# Patient Record
Sex: FEMALE | Race: OTHER | NOT HISPANIC OR LATINO
[De-identification: names, ages, dates, MRNs, and addresses within clinical notes are randomized per-mention and may not be internally consistent; named-entity substitution may affect disease eponyms.]

---

## 2020-11-13 ENCOUNTER — APPOINTMENT (OUTPATIENT)
Dept: GASTROENTEROLOGY | Facility: CLINIC | Age: 55
End: 2020-11-13
Payer: COMMERCIAL

## 2020-11-13 DIAGNOSIS — Z78.9 OTHER SPECIFIED HEALTH STATUS: ICD-10-CM

## 2020-11-13 DIAGNOSIS — Z80.1 FAMILY HISTORY OF MALIGNANT NEOPLASM OF TRACHEA, BRONCHUS AND LUNG: ICD-10-CM

## 2020-11-13 DIAGNOSIS — Z80.3 FAMILY HISTORY OF MALIGNANT NEOPLASM OF BREAST: ICD-10-CM

## 2020-11-13 DIAGNOSIS — Z82.49 FAMILY HISTORY OF ISCHEMIC HEART DISEASE AND OTHER DISEASES OF THE CIRCULATORY SYSTEM: ICD-10-CM

## 2020-11-13 PROBLEM — Z00.00 ENCOUNTER FOR PREVENTIVE HEALTH EXAMINATION: Status: ACTIVE | Noted: 2020-11-13

## 2020-11-13 PROCEDURE — 99204 OFFICE O/P NEW MOD 45 MIN: CPT | Mod: 95

## 2020-11-13 NOTE — ASSESSMENT
[FreeTextEntry1] : The patient is at average risk for colorectal cancer. The bowel preparation was discussed at length. Risks (including bleeding, pain, perforation, incomplete examination, splenic laceration, adverse reactions to medications, aspiration and death), benefits and alternatives were discussed. Patient is agreeable for the colonoscopy. The patient is medically optimized for the procedure. We will schedule the patient for the procedure. Bowel preparation was sent to the pharmacy.\par \par I spent 20 minutes on the encounter\par \par \par Tien Grififn MD\par Gastroenterology \par \par

## 2020-11-13 NOTE — HISTORY OF PRESENT ILLNESS
[Home] : at home, [unfilled] , at the time of the visit. [Medical Office: ([facility])___] : at the medical office located in  [Spouse] : spouse [Verbal consent obtained from patient] : the patient, [unfilled] [de-identified] : The patient is being consulted for colorectal cancer screening. The patient is denying any complaints. She never had a colonoscopy before. She has no GI symptoms. She has no family history of colorectal cancer.no chest pain or shortness of breath.

## 2021-03-08 DIAGNOSIS — Z01.818 ENCOUNTER FOR OTHER PREPROCEDURAL EXAMINATION: ICD-10-CM

## 2021-03-12 ENCOUNTER — APPOINTMENT (OUTPATIENT)
Dept: DISASTER EMERGENCY | Facility: CLINIC | Age: 56
End: 2021-03-12

## 2021-03-13 LAB — SARS-COV-2 N GENE NPH QL NAA+PROBE: NOT DETECTED

## 2021-03-16 ENCOUNTER — OUTPATIENT (OUTPATIENT)
Dept: OUTPATIENT SERVICES | Facility: HOSPITAL | Age: 56
LOS: 1 days | End: 2021-03-16
Payer: COMMERCIAL

## 2021-03-16 ENCOUNTER — APPOINTMENT (OUTPATIENT)
Dept: GASTROENTEROLOGY | Facility: GI CENTER | Age: 56
End: 2021-03-16
Payer: COMMERCIAL

## 2021-03-16 ENCOUNTER — RESULT REVIEW (OUTPATIENT)
Age: 56
End: 2021-03-16

## 2021-03-16 DIAGNOSIS — K63.5 POLYP OF COLON: ICD-10-CM

## 2021-03-16 DIAGNOSIS — Z12.11 ENCOUNTER FOR SCREENING FOR MALIGNANT NEOPLASM OF COLON: ICD-10-CM

## 2021-03-16 DIAGNOSIS — K64.8 OTHER HEMORRHOIDS: ICD-10-CM

## 2021-03-16 DIAGNOSIS — K57.30 DIVERTICULOSIS OF LARGE INTESTINE W/OUT PERFORATION OR ABSCESS W/OUT BLEEDING: ICD-10-CM

## 2021-03-16 PROCEDURE — 45380 COLONOSCOPY AND BIOPSY: CPT | Mod: PT

## 2021-03-16 PROCEDURE — 88305 TISSUE EXAM BY PATHOLOGIST: CPT | Mod: 26

## 2021-03-16 PROCEDURE — 45380 COLONOSCOPY AND BIOPSY: CPT | Mod: 33

## 2021-03-16 PROCEDURE — 88305 TISSUE EXAM BY PATHOLOGIST: CPT

## 2021-03-16 RX ORDER — SODIUM SULFATE, POTASSIUM SULFATE, MAGNESIUM SULFATE 17.5; 3.13; 1.6 G/ML; G/ML; G/ML
17.5-3.13-1.6 SOLUTION, CONCENTRATE ORAL
Qty: 1 | Refills: 0 | Status: COMPLETED | COMMUNITY
Start: 2020-11-13 | End: 2021-03-16

## 2021-03-16 NOTE — ASSESSMENT
[FreeTextEntry1] : IMPRESSION:\par Colon polyps x 3 \par Diverticulosis of colon\par Internal hemorrhoids\par \par RECOMMENDATIONS:\par High fiber diet\par Repeat colonoscopy in 5 years

## 2021-03-16 NOTE — PROCEDURE
[With Biopsy] : with biopsy [With Polypectomy] : polypectomy [Colon Cancer Screening] : colon cancer screening [Procedure Explained] : The procedure was explained [Allergies Reviewed] : allergies reviewed. [Risks] : Risks [Benefits] : benefits [Alternatives] : alternatives [Consent Obtained] : written consent was obtained prior to the procedure and is detailed in the patient's record [Patient] : the patient [Bowel Prep Kit] : the patient took the appropriate bowel preparation kit as directed [Approved Diet Followed] : the patient avoided solid foods and adhered to the approved diet list for 24 hours prior to the procedure [Automated Blood Pressure Cuff] : automated blood pressure cuff [Cardiac Monitor] : cardiac monitor [Pulse Oximeter] : pulse oximeter [1] : 1 [Prep Qualtiy: ___] : Prep Quality:  [unfilled] [Withdrawal Time: ___] : Withdrawal Time:  [unfilled] [Performed By: ___] : Performed by:  PAULY [Left Lateral Decubitus] : The patient was positioned in the left lateral decubitus position [Cecum (Landmarks)] : and guided to the cecum which was identified by the anatomic landmarks of the appendiceal orifice and ileocecal valve [Terminal Ileum via Ileocecal Valve] : and the terminal ileum was examined by entering the ileocecal valve [No Difficulty] : without difficulty [Insufflated] : insufflated [Single Pass Needed] : after a single pass [Normal] : Normal [Diverticulosis] : diverticulosis [Polyps] : polyps [Biopsy] : biopsy [Hemorrhoids] : hemorrhoids [Sent to Pathology] : was sent to pathology for analysis [Tolerated Well] : the patient tolerated the procedure well [Vital Signs Stable] : the vital signs were stable [No Complications] : There were no complications [Abnormal Rectum] : a normal rectum [External Hemorrhoids] : no external hemorrhoids [Patient Rotated Into Alternating Positions] : the patient was not rotated [de-identified] : Two polyps (2mm, 3 mm) noted s/p cold biopsy forceps polypectomy\par Normal ileocecal valve\par Normal terminal ileum [de-identified] : 3 mm polyp noted s/p cold biopsy forceps polypectomy [de-identified] : Cecal polyps x 2; Sigmoid colon polyp

## 2021-03-16 NOTE — PHYSICAL EXAM
[General Appearance - Alert] : alert [General Appearance - In No Acute Distress] : in no acute distress [General Appearance - Well Nourished] : well nourished [General Appearance - Well Developed] : well developed [General Appearance - Well-Appearing] : healthy appearing [Sclera] : the sclera and conjunctiva were normal [PERRL With Normal Accommodation] : pupils were equal in size, round, and reactive to light [Extraocular Movements] : extraocular movements were intact [Outer Ear] : the ears and nose were normal in appearance [Oropharynx] : the oropharynx was normal [Neck Appearance] : the appearance of the neck was normal [Neck Cervical Mass (___cm)] : no neck mass was observed [Jugular Venous Distention Increased] : there was no jugular-venous distention [Thyroid Diffuse Enlargement] : the thyroid was not enlarged [Thyroid Nodule] : there were no palpable thyroid nodules [Auscultation Breath Sounds / Voice Sounds] : lungs were clear to auscultation bilaterally [Heart Rate And Rhythm] : heart rate was normal and rhythm regular [Heart Sounds] : normal S1 and S2 [Heart Sounds Gallop] : no gallops [Murmurs] : no murmurs [Heart Sounds Pericardial Friction Rub] : no pericardial rub [Bowel Sounds] : normal bowel sounds [Abdomen Soft] : soft [Abdomen Tenderness] : non-tender [Abdomen Mass (___ Cm)] : no abdominal mass palpated [Cervical Lymph Nodes Enlarged Posterior Bilaterally] : posterior cervical [Cervical Lymph Nodes Enlarged Anterior Bilaterally] : anterior cervical [Supraclavicular Lymph Nodes Enlarged Bilaterally] : supraclavicular [No CVA Tenderness] : no ~M costovertebral angle tenderness [No Spinal Tenderness] : no spinal tenderness [Abnormal Walk] : normal gait [Nail Clubbing] : no clubbing  or cyanosis of the fingernails [Musculoskeletal - Swelling] : no joint swelling seen [Motor Tone] : muscle strength and tone were normal [Skin Color & Pigmentation] : normal skin color and pigmentation [Skin Turgor] : normal skin turgor [] : no rash [No Focal Deficits] : no focal deficits [Oriented To Time, Place, And Person] : oriented to person, place, and time [Impaired Insight] : insight and judgment were intact [Affect] : the affect was normal

## 2021-03-16 NOTE — PROCEDURE
[With Biopsy] : with biopsy [With Polypectomy] : polypectomy [Colon Cancer Screening] : colon cancer screening [Procedure Explained] : The procedure was explained [Allergies Reviewed] : allergies reviewed. [Risks] : Risks [Benefits] : benefits [Alternatives] : alternatives [Consent Obtained] : written consent was obtained prior to the procedure and is detailed in the patient's record [Patient] : the patient [Bowel Prep Kit] : the patient took the appropriate bowel preparation kit as directed [Approved Diet Followed] : the patient avoided solid foods and adhered to the approved diet list for 24 hours prior to the procedure [Automated Blood Pressure Cuff] : automated blood pressure cuff [Cardiac Monitor] : cardiac monitor [Pulse Oximeter] : pulse oximeter [1] : 1 [Prep Qualtiy: ___] : Prep Quality:  [unfilled] [Withdrawal Time: ___] : Withdrawal Time:  [unfilled] [Performed By: ___] : Performed by:  PAULY [Left Lateral Decubitus] : The patient was positioned in the left lateral decubitus position [Cecum (Landmarks)] : and guided to the cecum which was identified by the anatomic landmarks of the appendiceal orifice and ileocecal valve [Terminal Ileum via Ileocecal Valve] : and the terminal ileum was examined by entering the ileocecal valve [No Difficulty] : without difficulty [Insufflated] : insufflated [Single Pass Needed] : after a single pass [Normal] : Normal [Diverticulosis] : diverticulosis [Polyps] : polyps [Biopsy] : biopsy [Hemorrhoids] : hemorrhoids [Sent to Pathology] : was sent to pathology for analysis [Tolerated Well] : the patient tolerated the procedure well [Vital Signs Stable] : the vital signs were stable [No Complications] : There were no complications [Abnormal Rectum] : a normal rectum [External Hemorrhoids] : no external hemorrhoids [Patient Rotated Into Alternating Positions] : the patient was not rotated [de-identified] : Two polyps (2mm, 3 mm) noted s/p cold biopsy forceps polypectomy\par Normal ileocecal valve\par Normal terminal ileum [de-identified] : 3 mm polyp noted s/p cold biopsy forceps polypectomy [de-identified] : Cecal polyps x 2; Sigmoid colon polyp

## 2021-03-18 LAB — SURGICAL PATHOLOGY STUDY: SIGNIFICANT CHANGE UP

## 2021-03-24 ENCOUNTER — NON-APPOINTMENT (OUTPATIENT)
Age: 56
End: 2021-03-24

## 2021-04-01 ENCOUNTER — APPOINTMENT (OUTPATIENT)
Dept: DISASTER EMERGENCY | Facility: OTHER | Age: 56
End: 2021-04-01

## 2022-08-12 ENCOUNTER — EMERGENCY (EMERGENCY)
Facility: HOSPITAL | Age: 57
LOS: 1 days | Discharge: HOME | End: 2022-08-12
Attending: EMERGENCY MEDICINE
Payer: COMMERCIAL

## 2022-08-12 VITALS
DIASTOLIC BLOOD PRESSURE: 80 MMHG | SYSTOLIC BLOOD PRESSURE: 126 MMHG | TEMPERATURE: 98 F | OXYGEN SATURATION: 98 % | HEART RATE: 76 BPM | WEIGHT: 147.93 LBS | RESPIRATION RATE: 16 BRPM

## 2022-08-12 PROCEDURE — 12001 RPR S/N/AX/GEN/TRNK 2.5CM/<: CPT

## 2022-08-12 NOTE — ED ADULT TRIAGE NOTE - CHIEF COMPLAINT QUOTE
S/P mechanical fall at the top of the stairs. PT tripped and fell backwards hitting head on wooden step and then slid down 8 steps on her backside. Denies LOC or use of blood thinners.  Reports drinking a couple glasses of wine tonight. GCS15. Denies blurred vision, nausea or dizziness. Laceration note to posterior side of head. Bleeding controlled at this time. Denies any other injury.

## 2022-08-13 ENCOUNTER — TRANSCRIPTION ENCOUNTER (OUTPATIENT)
Age: 57
End: 2022-08-13

## 2022-08-13 VITALS
TEMPERATURE: 98 F | OXYGEN SATURATION: 97 % | SYSTOLIC BLOOD PRESSURE: 121 MMHG | HEART RATE: 81 BPM | RESPIRATION RATE: 18 BRPM | DIASTOLIC BLOOD PRESSURE: 80 MMHG

## 2022-08-13 LAB
ANION GAP SERPL CALC-SCNC: 15 MMOL/L — SIGNIFICANT CHANGE UP (ref 5–17)
BASOPHILS # BLD AUTO: 0.04 K/UL — SIGNIFICANT CHANGE UP (ref 0–0.2)
BASOPHILS NFR BLD AUTO: 0.5 % — SIGNIFICANT CHANGE UP (ref 0–2)
BUN SERPL-MCNC: 17 MG/DL — SIGNIFICANT CHANGE UP (ref 8–20)
CALCIUM SERPL-MCNC: 9.4 MG/DL — SIGNIFICANT CHANGE UP (ref 8.4–10.5)
CHLORIDE SERPL-SCNC: 106 MMOL/L — SIGNIFICANT CHANGE UP (ref 98–107)
CO2 SERPL-SCNC: 23 MMOL/L — SIGNIFICANT CHANGE UP (ref 22–29)
CREAT SERPL-MCNC: 0.56 MG/DL — SIGNIFICANT CHANGE UP (ref 0.5–1.3)
EGFR: 106 ML/MIN/1.73M2 — SIGNIFICANT CHANGE UP
EOSINOPHIL # BLD AUTO: 0.06 K/UL — SIGNIFICANT CHANGE UP (ref 0–0.5)
EOSINOPHIL NFR BLD AUTO: 0.7 % — SIGNIFICANT CHANGE UP (ref 0–6)
GLUCOSE SERPL-MCNC: 121 MG/DL — HIGH (ref 70–99)
HCT VFR BLD CALC: 40.3 % — SIGNIFICANT CHANGE UP (ref 34.5–45)
HGB BLD-MCNC: 13.5 G/DL — SIGNIFICANT CHANGE UP (ref 11.5–15.5)
IMM GRANULOCYTES NFR BLD AUTO: 0.2 % — SIGNIFICANT CHANGE UP (ref 0–1.5)
LYMPHOCYTES # BLD AUTO: 1.6 K/UL — SIGNIFICANT CHANGE UP (ref 1–3.3)
LYMPHOCYTES # BLD AUTO: 19.9 % — SIGNIFICANT CHANGE UP (ref 13–44)
MCHC RBC-ENTMCNC: 30.5 PG — SIGNIFICANT CHANGE UP (ref 27–34)
MCHC RBC-ENTMCNC: 33.5 GM/DL — SIGNIFICANT CHANGE UP (ref 32–36)
MCV RBC AUTO: 91 FL — SIGNIFICANT CHANGE UP (ref 80–100)
MONOCYTES # BLD AUTO: 0.5 K/UL — SIGNIFICANT CHANGE UP (ref 0–0.9)
MONOCYTES NFR BLD AUTO: 6.2 % — SIGNIFICANT CHANGE UP (ref 2–14)
NEUTROPHILS # BLD AUTO: 5.84 K/UL — SIGNIFICANT CHANGE UP (ref 1.8–7.4)
NEUTROPHILS NFR BLD AUTO: 72.5 % — SIGNIFICANT CHANGE UP (ref 43–77)
PLATELET # BLD AUTO: 198 K/UL — SIGNIFICANT CHANGE UP (ref 150–400)
POTASSIUM SERPL-MCNC: 4.3 MMOL/L — SIGNIFICANT CHANGE UP (ref 3.5–5.3)
POTASSIUM SERPL-SCNC: 4.3 MMOL/L — SIGNIFICANT CHANGE UP (ref 3.5–5.3)
RBC # BLD: 4.43 M/UL — SIGNIFICANT CHANGE UP (ref 3.8–5.2)
RBC # FLD: 13.1 % — SIGNIFICANT CHANGE UP (ref 10.3–14.5)
SODIUM SERPL-SCNC: 143 MMOL/L — SIGNIFICANT CHANGE UP (ref 135–145)
WBC # BLD: 8.06 K/UL — SIGNIFICANT CHANGE UP (ref 3.8–10.5)
WBC # FLD AUTO: 8.06 K/UL — SIGNIFICANT CHANGE UP (ref 3.8–10.5)

## 2022-08-13 PROCEDURE — G1004: CPT

## 2022-08-13 PROCEDURE — 80048 BASIC METABOLIC PNL TOTAL CA: CPT

## 2022-08-13 PROCEDURE — 70496 CT ANGIOGRAPHY HEAD: CPT | Mod: 26,MG

## 2022-08-13 PROCEDURE — 70450 CT HEAD/BRAIN W/O DYE: CPT | Mod: 26,MA,77

## 2022-08-13 PROCEDURE — 99285 EMERGENCY DEPT VISIT HI MDM: CPT

## 2022-08-13 PROCEDURE — 85025 COMPLETE CBC W/AUTO DIFF WBC: CPT

## 2022-08-13 PROCEDURE — 70450 CT HEAD/BRAIN W/O DYE: CPT | Mod: MA

## 2022-08-13 PROCEDURE — 99204 OFFICE O/P NEW MOD 45 MIN: CPT

## 2022-08-13 PROCEDURE — 70553 MRI BRAIN STEM W/O & W/DYE: CPT | Mod: MF

## 2022-08-13 PROCEDURE — G0378: CPT

## 2022-08-13 PROCEDURE — 70498 CT ANGIOGRAPHY NECK: CPT | Mod: MG

## 2022-08-13 PROCEDURE — 72125 CT NECK SPINE W/O DYE: CPT | Mod: MA

## 2022-08-13 PROCEDURE — 70498 CT ANGIOGRAPHY NECK: CPT | Mod: 26,MG

## 2022-08-13 PROCEDURE — 36415 COLL VENOUS BLD VENIPUNCTURE: CPT

## 2022-08-13 PROCEDURE — 72125 CT NECK SPINE W/O DYE: CPT | Mod: 26,MA

## 2022-08-13 PROCEDURE — 70553 MRI BRAIN STEM W/O & W/DYE: CPT | Mod: 26,MF

## 2022-08-13 PROCEDURE — A9579: CPT

## 2022-08-13 PROCEDURE — 70496 CT ANGIOGRAPHY HEAD: CPT | Mod: MG

## 2022-08-13 PROCEDURE — 12001 RPR S/N/AX/GEN/TRNK 2.5CM/<: CPT

## 2022-08-13 PROCEDURE — 99218: CPT | Mod: 24

## 2022-08-13 NOTE — CHART NOTE - NSCHARTNOTEFT_GEN_A_CORE
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      CT Angio Head w/ IV Cont (08.13.22 @ 08:41)      Large branching area of abnormal enhancement involving the   right cerebellar region is again seen which is likely compatible with   underlying developmental venous anomaly. Previously mentioned high   attenuated lesion involving the right cerebellar region is more likely   compatible with underlying cavernoma given the DVA in this region and   less likely compatible with area of acute hemorrhage or mineralization   though continued close follow-up is recommended.       CT Head No Cont (08.13.22 @ 02:26)    Indeterminate small foci of hyperattenuation within the right   cerebellum. Hemorrhage cannot be excluded in the setting of trauma and   without prior imaging for comparison. Repeat CT in 4 to 6 hours is   recommended.      Ok to safely discharge the patient home  Denies HA's/N/V. Tolerated PO intake well.   I spoke with her & her  about all results.   She will need to follow up with Dr Del Castillo in 2 wks for an appointment & will establish routine followup thereafter    Dr Del Castillo  [phone]

## 2022-08-13 NOTE — ED PROVIDER NOTE - ATTENDING CONTRIBUTION TO CARE
Low mechanism fall with isolated head trauma, small occipital scalp laceration repaired with 2 staples. CTH with questionable cerebellar hemorrhage, evaluated by neurosurgery, recommend repeat CT with CTA to evaluated further. Hemodynamically stable, neurologically intact.

## 2022-08-13 NOTE — ED PROVIDER NOTE - NSFOLLOWUPINSTRUCTIONS_ED_ALL_ED_FT
Have the staples removed in 7-10 days. This can be done at any hospital or with your primary doctor.    Today you decided to leave against medical advice. Please return for any signs of worsening as it is unclear at this time if you have a life threatening injury which could result of permanent disability or death.     Laceration    A laceration is a cut that goes through all of the layers of the skin and into the tissue that is right under the skin. Some lacerations heal on their own. Others need to be closed with skin adhesive strips, skin glue, stitches (sutures), or staples. Proper laceration care minimizes the risk of infection and helps the laceration to heal better.  If non-absorbable stitches or staples have been placed, they must be taken out within the time frame instructed by your healthcare provider.    SEEK IMMEDIATE MEDICAL CARE IF YOU HAVE ANY OF THE FOLLOWING SYMPTOMS: swelling around the wound, worsening pain, drainage from the wound, red streaking going away from your wound, inability to move finger or toe near the laceration, or discoloration of skin near the laceration.     Syncope    Syncope is when you temporarily lose consciousness, also called fainting or passing out. It is caused by a sudden decrease in blood flow to the brain. Even though most causes of syncope are not dangerous, syncope can possibly be a sign of a serious medical problem. Signs that you may be about to faint include feeling dizzy, lightheaded, nausea, visual changes, or cold/clammy skin. Do not drive, operate heavy machinery, or play sports until your health care provider says it is okay.    SEEK IMMEDIATE MEDICAL CARE IF YOU HAVE ANY OF THE FOLLOWING SYMPTOMS: severe headache, pain in your chest/abdomen/back, bleeding from your mouth or rectum, palpitations, shortness of breath, pain with breathing, seizure, confusion, or trouble walking.

## 2022-08-13 NOTE — ED ADULT NURSE NOTE - NSIMPLEMENTINTERV_GEN_ALL_ED
Implemented All Fall Risk Interventions:  [place] to call system. Call bell, personal items and telephone within reach. Instruct patient to call for assistance. Room bathroom lighting operational. Non-slip footwear when patient is off stretcher. Physically safe environment: no spills, clutter or unnecessary equipment. Stretcher in lowest position, wheels locked, appropriate side rails in place. Provide visual cue, wrist band, yellow gown, etc. Monitor gait and stability. Monitor for mental status changes and reorient to person, place, and time. Review medications for side effects contributing to fall risk. Reinforce activity limits and safety measures with patient and family.

## 2022-08-13 NOTE — ED CDU PROVIDER INITIAL DAY NOTE - ATTENDING APP SHARED VISIT CONTRIBUTION OF CARE
incidental findings on head ct requiring further invesitgation, neurosurg consult appreciated; pending MRI    I, Hamilton English, participated in the care of this patient with the ACP. I discussed the history and physical exam findings as well as lab results and plan of care with the ACP. I agree with ACP's history, physical and assessment.

## 2022-08-13 NOTE — ED PROVIDER NOTE - CARE PROVIDER_API CALL
Justin Del Castillo)  Neurosurgery  [address], [address]  [place] [address]  Phone: [phone]  Fax: [phone]  Follow Up Time: 1-3 Days

## 2022-08-13 NOTE — CONSULT NOTE ADULT - ASSESSMENT
58 y/o F no PMH presenting to [facility] s/p fall down stairs while intoxicated. +HS. No LOC or syncope. CTH cannot r/o small area of hemorrhage in R cerebellum.    Plan:  -Will d/w attending  -Q4h neuro checks while in house  -Repeat CTH in 4 hours  -Pain control PRN; avoid oversedation  -SCDs for DVT PPX; hold AC/AP agents at this time  -No acute neurosurgical intervention indicated at this time   -Further plan pending repeat imaging

## 2022-08-13 NOTE — ED ADULT NURSE NOTE - OBJECTIVE STATEMENT
Aox4 c/o S/P mechanical fall at the top of the stairs. PT tripped and fell backwards hitting head on wooden step and then slid down 8 steps on her backside. Denies LOC or use of blood thinners.  Reports drinking a couple glasses of wine tonight. GCS15. Denies blurred vision, nausea or dizziness. Laceration note to posterior side of head. Bleeding controlled at this time. Denies any other injury.

## 2022-08-13 NOTE — ED CDU PROVIDER DISPOSITION NOTE - ATTENDING CONTRIBUTION TO CARE
mri reviewed, neurosrug consult appreciated; ok for d/c with outpt f/u    I, Hamilton English, participated in the care of this patient with the ACP. I discussed the history and physical exam findings as well as lab results and plan of care with the ACP. I agree with ACP's history, physical and assessment.

## 2022-08-13 NOTE — ED PROVIDER NOTE - NS ED ROS FT
- CONSTITUTIONAL: Denies fever, chills.    - HEENT: Denies changes in vision or hearing.    - RESPIRATORY: Denies SOB or cough    - CV: Denies palpitations, CP.    - GI: Denies abdominal pain, nausea, vomiting or diarrhea    - : Denies dysuria or hematuria    - MSK: Denies myalgia and joint pain.    - SKIN: Denies rash and pruritus.    - NEUROLOGICAL: Denies headache and syncope.    - PSYCHIATRIC: Denies recent changes in mood. Denies anxiety and depression.

## 2022-08-13 NOTE — ED PROVIDER NOTE - PATIENT PORTAL LINK FT
You can access the FollowMyHealth Patient Portal offered by  by registering at the following website: [email or web address]. By joining [employer]’s FollowMyHealth portal, you will also be able to view your health information using other applications (apps) compatible with our system.

## 2022-08-13 NOTE — ED CDU PROVIDER DISPOSITION NOTE - PATIENT PORTAL LINK FT
You can access the FollowMyHealth Patient Portal offered by [facility] by registering at the following website: [email or web address]. By joining [employer]’s FollowMyHealth portal, you will also be able to view your health information using other applications (apps) compatible with our system.

## 2022-08-13 NOTE — CHART NOTE - NSCHARTNOTEFT_GEN_A_CORE
Neurosurgery Note    MRI Brain w/wout samina ordered & Dr Del Castillo would like this done before discharge    Ok for patient to eat, if no contraindication, please advance diet.

## 2022-08-13 NOTE — ED CDU PROVIDER INITIAL DAY NOTE - OBJECTIVE STATEMENT
58 y/o F with no PMH c/o laceration to back of head which occurred after a mechanical fall down some stairs last night.  Patient was drinking alcohol at a wedding at the time of the incident.  Denies LOC, nausea/vomiting, use of anticoagulants.

## 2022-08-13 NOTE — ED PROVIDER NOTE - CLINICAL SUMMARY MEDICAL DECISION MAKING FREE TEXT BOX
57YOF presents to [facility] ED tonight with bleeding from the scalp after falling down several stairs and hitting her head.    - CBC, BMP ordered to r/o anemia, infection, electrolyte abnormalities   - Non contrast Head CT ordered to r/o intracranial hemorrhage Low mechanism fall with isolated head trauma, small occipital scalp laceration repaired with 2 staples. CTH with questionable cerebellar hemorrhage, evaluated by neurosurgery, recommend repeat CT with CTA to evaluated further. Hemodynamically stable, neurologically intact.

## 2022-08-13 NOTE — CONSULT NOTE ADULT - SUBJECTIVE AND OBJECTIVE BOX
Patient is a 57y old  Female who presents with a chief complaint of fall down stairs.    HPI: Patient is a 56 y/o F no PMH presenting to [facility] s/p fall down stairs. Patient states that she was at a wedding tonight and drank 5 glasses of wine. She went upstairs to bring her son water and tripped and fell backwards down the whole flight of stairs on her way down. +HS. Denies LOC or syncope. When she arose she noticed bleeding from her scalp. Admits to superficial scalp pain. Denies headache, neck pain, visual disturbance, weakness, numbness/tingling, N/V/D, dizziness.      SOCIAL HISTORY:  Tobacco Use: Denies.  EtOH use: Weekly.  Substance: Denies.    Allergies: No Known Allergies    REVIEW OF SYSTEMS  Negative except as noted in HPI  CONSTITUTIONAL: No fever, weight loss, or fatigue  EYES: No eye pain, visual disturbances, or discharge  ENMT:  No difficulty hearing, tinnitus, vertigo; No sinus or throat pain  NECK: No pain or stiffness  BREASTS: No pain, masses, or nipple discharge  RESPIRATORY: No cough, wheezing, chills or hemoptysis; No shortness of breath  CARDIOVASCULAR: No chest pain, palpitations, dizziness, or leg swelling  GASTROINTESTINAL: No abdominal or epigastric pain. No nausea, vomiting, or hematemesis; No diarrhea or constipation. No melena or hematochezia.  GENITOURINARY: No dysuria, frequency, hematuria, or incontinence  NEUROLOGICAL: No headaches, memory loss, loss of strength, numbness, or tremors  SKIN: No itching, burning, rashes, or lesions   LYMPH NODES: No enlarged glands  ENDOCRINE: No heat or cold intolerance; No hair loss  MUSCULOSKELETAL: No joint pain or swelling; No muscle, back, or extremity pain  PSYCHIATRIC: No depression, anxiety, mood swings, or difficulty sleeping  HEME/LYMPH: No easy bruising, or bleeding gums  ALLERY AND IMMUNOLOGIC: No hives or eczema    Vital Signs Last 24 Hrs  T(C): 36.5 (12 Aug 2022 23:23), Max: 36.5 (12 Aug 2022 23:23)  T(F): 97.7 (12 Aug 2022 23:23), Max: 97.7 (12 Aug 2022 23:23)  HR: 76 (12 Aug 2022 23:23) (76 - 76)  BP: 126/80 (12 Aug 2022 23:23) (126/80 - 126/80)  BP(mean): --  RR: 16 (12 Aug 2022 23:23) (16 - 16)  SpO2: 98% (12 Aug 2022 23:23) (98% - 98%)    Parameters below as of 12 Aug 2022 23:23  Patient On (Oxygen Delivery Method): room air      PHYSICAL EXAM:  GENERAL: NAD, well-groomed, well-developed  HEAD:  +traumatic posterior scalp lac repaired with staples, normocephalic  EYES: Conjunctiva and sclera clear; corneal reflex intact  ENMT: No tonsillar erythema, exudates, or enlargement; moist mucous membranes, good dentition, no lesions  NECK: Supple, no JVD, normal thyroid  MICHELLE COMA SCORE: E-4 V-5 M-6 = 15  MENTAL STATUS: AAO x3; Awake; Opens eyes spontaneously; Appropriately conversant without aphasia; following simple commands  CRANIAL NERVES: Visual fields full to confrontation, PERRL. EOMI without nystagmus. Facial sensation intact V1-3 distribution b/l. Face symmetric w/ normal eye closure and smile, tongue midline. Hearing grossly intact. Speech clear.   MOTOR: strength 5/5 b/l upper and lower extremities  SENSATION: grossly intact to light touch all extremities  EXTREMITIES:  2+ peripheral pulses, no clubbing, cyanosis, or edema  SKIN: Warm, dry; no rashes or lesions      RADIOLOGY & ADDITIONAL STUDIES:  < from: CT Head No Cont (08.13.22 @ 02:26) >  Impression:    1. Indeterminate small foci of hyperattenuation within the right   cerebellum. Hemorrhage cannot be excluded in the setting of trauma and   without prior imaging for comparison. Repeat CT in 4 to 6 hours is   recommended.  2. No evidence of acute osseous injury in the cervical spine.    --- End of Report ---            SVETLANA CARREON MD; Attending Radiologist  This document has been electronicallysigned. Aug 13 2022  3:14AM    < end of copied text >

## 2022-08-13 NOTE — ED PROVIDER NOTE - PROGRESS NOTE DETAILS
JOYCE Moody: Pt would like to AMA. Does not want to wait for neurosurgery recs. Displays intact judgement, is AO3, has family member with her who will bring her back / call [phone] if worsening. JOYCE Moody: after lengthy discussion patient was agreeable to staying, I did discuss case with nsx who came down to see patient, ordered MR brain which they want prior to d/c, patient now allowed to eat and drink, more agreeable to staying, d/w Dr. English and patient placed in OBS pending MR brain, she has been stable, ambulatory, and without any active complaints at this time.

## 2022-08-13 NOTE — ED PROVIDER NOTE - OBJECTIVE STATEMENT
57YOF presents to [facility] ED tonight for bleeding from the scalp after falling down several stairs and hitting her head.    Pt states at 11pm tonight (8/12) she was walking up the stairs while intoxicated, tripped and fell down the stairs, hitting her head on the way down. Pt arose to find bleeding from the scalp. Pt denies LOC or neurosensory changes.     Pt endorses mild superficial pain of the scalp, but denies headache. Pt denies LH/Dizziness, n/v. Pt denies any other significant trauma/injury from the fall. Pt denies fevers, chills, CP, SOB, abdominal pain, dysuria, hematuria, hematochezia.    Of note pt consumed 5 glasses of wine earlier this evening while at a wedding.

## 2022-08-13 NOTE — ED PROVIDER NOTE - PHYSICAL EXAMINATION
- GENERAL: Alert and oriented x 3. No acute distress. Well-nourished.    - EYES: EOMI. Anicteric.    - HENT: Moist mucous membranes. No scleral icterus. No cervical lymphadenopathy.    - LUNGS: Clear to auscultation bilaterally. No accessory muscle use.    - CARDIOVASCULAR: Regular rate and rhythm. No murmur. No JVD.    - ABDOMEN: Soft, non-tender and non-distended. No palpable masses.    - EXTREMITIES: No edema. Non-tender     - ?SKIN: 3 abrasions along the scalp & one 3cm laceration    - NEUROLOGIC: No focal neurological deficits. CN II-XII grossly intact    - PSYCHIATRIC: Cooperative. Appropriate mood and affect.

## 2022-08-17 PROBLEM — Z78.9 OTHER SPECIFIED HEALTH STATUS: Chronic | Status: ACTIVE | Noted: 2022-08-13

## 2022-09-13 ENCOUNTER — APPOINTMENT (OUTPATIENT)
Dept: NEUROSURGERY | Facility: CLINIC | Age: 57
End: 2022-09-13

## 2022-09-13 VITALS
SYSTOLIC BLOOD PRESSURE: 107 MMHG | OXYGEN SATURATION: 99 % | HEART RATE: 69 BPM | BODY MASS INDEX: 25.61 KG/M2 | WEIGHT: 150 LBS | HEIGHT: 64 IN | DIASTOLIC BLOOD PRESSURE: 70 MMHG | TEMPERATURE: 97.9 F

## 2022-09-13 DIAGNOSIS — Q28.3 OTHER MALFORMATIONS OF CEREBRAL VESSELS: ICD-10-CM

## 2022-09-13 PROCEDURE — 99213 OFFICE O/P EST LOW 20 MIN: CPT

## 2022-09-13 NOTE — ASSESSMENT
[FreeTextEntry1] : Ms. Mckeon presents with above history and imaging.  She is neurologically intact.  I have personally reviewed all her imaging with her and spouse.  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 No previous intracranial imaging. No neurological complaints.\par Maintain BP medications as ordered to maintained BP <140/90.  \par LDL goal of < 70 for secondary stroke prevention.\par MRI brain w/wo contrast cavernoma in 3 months 12/2022.\par Follow up after imaging.\par Patient has been given an opportunity to ask and have their questions answered to their satisfaction.\par Patient knows to call the office if there are any new or worsening symptoms.\par

## 2022-09-13 NOTE — HISTORY OF PRESENT ILLNESS
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 No significant medical history.  Patient mentions she present to [facility] 9/24/[facility]22 s/p fall without LOC. 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  Patient denies any previous intracranial imaging.\par Today she presents with no complaints of headaches, n/v or vision changes.  No seizures.  Denies any numbness/tingling or weakness of extremities. \par \par

## 2022-09-13 NOTE — DATA REVIEWED
[de-identified] : ACC: [id number] EXAM: MR BRAIN WAW IC\par \par PROCEDURE DATE: 08/13/2022\par \par \par \par INTERPRETATION: Exam Date: 8/13/2022 4:03 PM\par \par MR brain with and without gadolinium\par \par CLINICAL INFORMATION: Head trauma, minor, normal mental status cerebellar cavanova\par \par TECHNIQUE: Multiplanar imaging of the brain was performed pre- and post-IV contrast. 6 cc of Gadavist was administered. 1.5 cc was discarded.\par \par COMPARISON: CT head 8/13/2022\par \par FINDINGS:\par \par There is a 9 x 7 mm focus of T2 hyperintensity within the right anterior cerebellum with a peripheral rim of T2 hypointensity, and associated enhancement, compatible with a cavernoma. There is a large associated developmental venous anomaly.\par \par The ventricles, sulci and basal cisterns appear unremarkable. There is no evidence of acute infarct or hemorrhage. There is no evidence of midline shift or herniation pattern. No mass effect is found in the brain.\par \par The vertebral and internal carotid arteries demonstrate expected flow voids indicating their patency.\par \par The orbits are unremarkable. 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 There is a large associated developmental venous anomaly.

## 2022-09-13 NOTE — REVIEW OF SYSTEMS
[As Noted in HPI] : as noted in HPI [Numbness] : no numbness [Tingling] : no tingling [Seizures] : no convulsions [Dizziness] : no dizziness [Difficulty Walking] : no difficulty walking [Negative] : Heme/Lymph

## 2022-09-13 NOTE — PHYSICAL EXAM
[FreeTextEntry1] : Awake, alert, and oriented x 3. VSS. In no apparent distress. Short and long term memory intact. Speech is clear and appropriate. Affect is normal. Voice is strong. Respirations easy and even. Normal skin color and pigmentation. The sclera and conjunctiva normal. Ears, nose, and neck normal in appearance. EOMI, no nystagmus, facial sensation not intact, V1, V2, V3 distribution on left diminished, face symmetrical, hearing intact bilaterally, tongue and palate midline, head turning and shoulder shrug symmetric and no tongue deviation with protrusion. No pronator drift. No past-pointing, no tremors noted, no dysdiadochokinesia, and finger to nose dysmetria was not present. Romberg negative. \par Right hand dominant.\par \par Rises from a seated position in a comfortable fashion.\par \par Gait is well coordinated and stable without the use of an assistive device. Unable to perform tandem walk without loss of balance. Motor strength in the upper extremities 5/5 in the biceps, triceps, and hand . Motor strength in the lower extremities is 5/5 in the iliopsoas, quadriceps, and hamstrings. \par \par

## 2023-03-15 ENCOUNTER — APPOINTMENT (OUTPATIENT)
Dept: MRI IMAGING | Facility: CLINIC | Age: 58
End: 2023-03-15
Payer: COMMERCIAL

## 2023-03-15 ENCOUNTER — OUTPATIENT (OUTPATIENT)
Dept: OUTPATIENT SERVICES | Facility: HOSPITAL | Age: 58
LOS: 1 days | End: 2023-03-15

## 2023-03-15 DIAGNOSIS — Q28.3 OTHER MALFORMATIONS OF CEREBRAL VESSELS: ICD-10-CM

## 2023-03-15 PROCEDURE — 70553 MRI BRAIN STEM W/O & W/DYE: CPT | Mod: 26

## 2023-03-16 ENCOUNTER — NON-APPOINTMENT (OUTPATIENT)
Age: 58
End: 2023-03-16
